# Patient Record
(demographics unavailable — no encounter records)

---

## 2025-01-22 NOTE — HISTORY OF PRESENT ILLNESS
[FreeTextEntry1] : Sana is a 17 year old female with MASLD who presents today with her mother for follow up. She was last seen in July 2024.   She has had no complaints and has been well since her last visit. She denies abdominal pain, nausea, vomiting, diarrhea, blood in stool, easy bleeding or bruising, rash, pruritus, jaundice, fevers, recurrent illnesses.   She has gained 6 pounds since her last visit 6 months ago. Her current weight is 242 pounds (99th percentile) and BMI is 41.8 (99th percentile). She reports still having trouble with portion control. We reviewed the types of food and portion sizes she should be eating. She also admits to not exercising often. She had a consult with nutrition at her last visit in July 2024 which she said was not helpful in making dietary adjustments.   Her last labs were done 7/9/24: AST/ALT 25/39 (17/36) (23/36) (32/57) (33/65) (45/82) (42/73) HgbA1c 5.6 (5.7) %  Abdominal ultrasounds:  9/14/20: Hepatomegaly with diffuse fatty infiltration of the liver, possible 2 mm gallbladder polyp vs adherent sludge   6/2/21: Fatty liver, 0.4 cm gallbladder polyp  8/1/22: No gallbladder polyp identified   A Fibroscan was performed today using the XL probe:  (297) (302) (305) (334) (329) (397) dB/m TE 6.0 (5.4) (6.3) (6.5) (6.1) (5.5) (6.1) kPa  Patient completed the NAFLD probiotic/placebo study in June 2022.   Sana is a senior in high school at [employer]. Choosing between [employer] and [employer] for next year.

## 2025-01-22 NOTE — CONSULT LETTER
[Dear  ___] : Dear  [unfilled], [Courtesy Letter:] : I had the pleasure of seeing your patient, [unfilled], in my office today. [Please see my note below.] : Please see my note below. [Consult Closing:] : Thank you very much for allowing me to participate in the care of this patient.  If you have any questions, please do not hesitate to contact me. [Sincerely,] : Sincerely, [FreeTextEntry3] : Mattie Rutledge-Kirti, \par  [facility]\par

## 2025-01-22 NOTE — CONSULT LETTER
[Dear  ___] : Dear  [unfilled], [Courtesy Letter:] : I had the pleasure of seeing your patient, [unfilled], in my office today. [Please see my note below.] : Please see my note below. [Consult Closing:] : Thank you very much for allowing me to participate in the care of this patient.  If you have any questions, please do not hesitate to contact me. [Sincerely,] : Sincerely, [FreeTextEntry3] : Mattie Rutledge-Kirti, \par  [facility]\par

## 2025-01-22 NOTE — HISTORY OF PRESENT ILLNESS
[FreeTextEntry1] : Sana is a 17 year old female with MASLD who presents today with her mother for follow up. She was last seen in July 2024.   She has had no complaints and has been well since her last visit. She denies abdominal pain, nausea, vomiting, diarrhea, blood in stool, easy bleeding or bruising, rash, pruritus, jaundice, fevers, recurrent illnesses.   She has gained 6 pounds since her last visit 6 months ago. Her current weight is 242 pounds (99th percentile) and BMI is 41.8 (99th percentile). She reports still having trouble with portion control. We reviewed the types of food and portion sizes she should be eating. She also admits to not exercising often. She had a consult with nutrition at her last visit in July 2024 which she said was not helpful in making dietary adjustments.   Her last labs were done 7/9/24: AST/ALT 25/39 (17/36) (23/36) (32/57) (33/65) (45/82) (42/73) HgbA1c 5.6 (5.7) %  Abdominal ultrasounds:  9/14/20: Hepatomegaly with diffuse fatty infiltration of the liver, possible 2 mm gallbladder polyp vs adherent sludge   6/2/21: Fatty liver, 0.4 cm gallbladder polyp  8/1/22: No gallbladder polyp identified   A Fibroscan was performed today using the XL probe:  (297) (302) (305) (334) (329) (397) dB/m TE 6.0 (5.4) (6.3) (6.5) (6.1) (5.5) (6.1) kPa  Patient completed the NAFLD probiotic/placebo study in June 2022.   Sana is a senior in high school at [employer]. Choosing between [employer] and [employer] for next year.

## 2025-01-22 NOTE — PHYSICAL EXAM
[Well Developed] : well developed [NAD] : in no acute distress [Moist & Pink Mucous Membranes] : moist and pink mucous membranes [CTAB] : lungs clear to auscultation bilaterally [Soft] : soft [Obese] : obese [Normal Bowel Sounds] : normal bowel sounds [No HSM] : no hepatosplenomegaly appreciated [Normal Tone] : normal tone [Well-Perfused] : well-perfused [Interactive] : interactive [Murmur] : no murmur [icteric] : anicteric [Respiratory Distress] : no respiratory distress  [Distended] : non distended [Tender] : non tender [Focal Deficits] : no focal deficits [Edema] : no edema [Cyanosis] : no cyanosis [Acanthosis Nigricans] : no acanthosis nigricans [Rash] : no rash [Jaundice] : no jaundice [de-identified] : + abdominal striae

## 2025-01-22 NOTE — PHYSICAL EXAM
[Well Developed] : well developed [NAD] : in no acute distress [Moist & Pink Mucous Membranes] : moist and pink mucous membranes [CTAB] : lungs clear to auscultation bilaterally [Soft] : soft [Obese] : obese [Normal Bowel Sounds] : normal bowel sounds [No HSM] : no hepatosplenomegaly appreciated [Normal Tone] : normal tone [Well-Perfused] : well-perfused [Interactive] : interactive [Murmur] : no murmur [icteric] : anicteric [Respiratory Distress] : no respiratory distress  [Distended] : non distended [Tender] : non tender [Focal Deficits] : no focal deficits [Edema] : no edema [Cyanosis] : no cyanosis [Acanthosis Nigricans] : no acanthosis nigricans [Rash] : no rash [Jaundice] : no jaundice [de-identified] : + abdominal striae

## 2025-01-22 NOTE — ASSESSMENT
[Educated Patient & Family about Diagnosis] : educated the patient and family about the diagnosis [FreeTextEntry1] : 17 year old female with MASLD and ongoing weight gain but with a stable steatosis score. Discussed extensively the importance of healthy eating, low carb/sugar diet, smaller portions, and exercise to help reduce weight. Sana was tearful during the visit when discussing her attempts at weight loss. We did discuss the benefit of a therapist, nutritionist and weight  (Dr Alas).   In regards to her gallbladder polyp, her most recent ultrasound did not visualize it, suggesting self-detachment vs sludge being mistaken for polyp in the past. No follow up needed.   1. Labs today  2. Healthy eating and exercise 3. Follow up in 6 months in the office for repeat Fibroscan and weight check before leaving for college 4. Consider weight , nutritionist, therapist given her consistent tearful encounters at each visit. Sana to consider and let us know.

## 2025-07-09 NOTE — CONSULT LETTER
[Dear  ___] : Dear  [unfilled], [Courtesy Letter:] : I had the pleasure of seeing your patient, [unfilled], in my office today. [Please see my note below.] : Please see my note below. [Consult Closing:] : Thank you very much for allowing me to participate in the care of this patient.  If you have any questions, please do not hesitate to contact me. [Sincerely,] : Sincerely, [FreeTextEntry3] : Mattie Rutledge-Kirti, \par  [facility]\par

## 2025-07-09 NOTE — CONSULT LETTER
[Dear  ___] : Dear  [unfilled], [Courtesy Letter:] : I had the pleasure of seeing your patient, [unfilled], in my office today. [Please see my note below.] : Please see my note below. [Consult Closing:] : Thank you very much for allowing me to participate in the care of this patient.  If you have any questions, please do not hesitate to contact me. [Sincerely,] : Sincerely, [FreeTextEntry3] : Mattie Rutledge-Kirti, \par  [facility]\par

## 2025-07-09 NOTE — ASSESSMENT
[Educated Patient & Family about Diagnosis] : educated the patient and family about the diagnosis [FreeTextEntry1] : 17 year old female with MASLD and ongoing weight gain but with a stable steatosis score. Discussed extensively the importance of healthy eating, low carb/sugar diet, smaller portions, and exercise to help reduce weight. Sana was tearful during the visit when discussing her attempts at weight loss. We did discuss the benefit of a therapist, nutritionist, and weight  (Dr Alas).   In regards to her gallbladder polyp, her most recent ultrasound did not visualize it, suggesting self-detachment vs sludge being mistaken for polyp in the past. No follow up needed.   1. Labs today  2. Healthy eating and exercise 3. Follow up in 6 months in the office for repeat Fibroscan and weight check before leaving for college 4. Consider weight , nutritionist, therapist given her consistent tearful encounters at each visit. Sana to consider and let us know.

## 2025-07-09 NOTE — PHYSICAL EXAM
[Well Developed] : well developed [NAD] : in no acute distress [Moist & Pink Mucous Membranes] : moist and pink mucous membranes [Soft] : soft [Obese] : obese [Normal Bowel Sounds] : normal bowel sounds [No HSM] : no hepatosplenomegaly appreciated [Normal Tone] : normal tone [Well-Perfused] : well-perfused [Interactive] : interactive [icteric] : anicteric [Respiratory Distress] : no respiratory distress  [Distended] : non distended [Tender] : non tender [Focal Deficits] : no focal deficits [Edema] : no edema [Cyanosis] : no cyanosis [Acanthosis Nigricans] : no acanthosis nigricans [Rash] : no rash [Jaundice] : no jaundice [de-identified] : + abdominal striae

## 2025-07-09 NOTE — PHYSICAL EXAM
[Well Developed] : well developed [NAD] : in no acute distress [Moist & Pink Mucous Membranes] : moist and pink mucous membranes [Soft] : soft [Obese] : obese [Normal Bowel Sounds] : normal bowel sounds [No HSM] : no hepatosplenomegaly appreciated [Normal Tone] : normal tone [Well-Perfused] : well-perfused [Interactive] : interactive [icteric] : anicteric [Respiratory Distress] : no respiratory distress  [Distended] : non distended [Tender] : non tender [Focal Deficits] : no focal deficits [Edema] : no edema [Cyanosis] : no cyanosis [Acanthosis Nigricans] : no acanthosis nigricans [Rash] : no rash [Jaundice] : no jaundice [de-identified] : + abdominal striae

## 2025-07-09 NOTE — HISTORY OF PRESENT ILLNESS
[FreeTextEntry1] : Sana is a 17 year old female with MASLD who presents today with her mother for follow up. She was last seen in January 2025.   She has had no complaints and has been well since her last visit. She denies abdominal pain, nausea, vomiting, diarrhea, blood in stool, easy bleeding or bruising, rash, pruritus, jaundice, fevers, recurrent illnesses.   She has gained 3 pounds since her last visit 6 months ago. Her current weight is 245 pounds (99th percentile) and BMI is 42.4 (99th percentile). She reports still having trouble with portion control. We reviewed the types of food and portion sizes she should be eating. She also admits to not exercising often. She had a consult with nutrition in July 2024 which she said was not helpful in making dietary adjustments.   Her last labs were done 7/9/24: AST/ALT 25/39 (17/36) (23/36) (32/57) (33/65) (45/82) (42/73) HgbA1c 5.6 (5.7) %  Abdominal ultrasounds:  9/14/20: Hepatomegaly with diffuse fatty infiltration of the liver, possible 2 mm gallbladder polyp vs adherent sludge   6/2/21: Fatty liver, 0.4 cm gallbladder polyp  8/1/22: No gallbladder polyp identified   A Fibroscan was performed today using the M probe (had been the XL probe in the past):  (309) (297) (302) (305) (334) (329) (397) dB/m TE 6.5 (6.0) (5.4) (6.3) (6.5) (6.1) (5.5) (6.1) kPa  Patient completed the NAFLD probiotic/placebo study in June 2022.   Sana is attending [employer] in the fall.

## 2025-07-09 NOTE — HISTORY OF PRESENT ILLNESS
[FreeTextEntry1] : Sana is a 17 year old female with MASLD who presents today with her mother for follow up. She was last seen in January 2025.   She has had no complaints and has been well since her last visit. She denies abdominal pain, nausea, vomiting, diarrhea, blood in stool, easy bleeding or bruising, rash, pruritus, jaundice, fevers, recurrent illnesses.   She has gained 3 pounds since her last visit 6 months ago. Her current weight is 245 pounds (99th percentile) and BMI is 42.4 (99th percentile). She reports still having trouble with portion control. We reviewed the types of food and portion sizes she should be eating. She also admits to not exercising often. She had a consult with nutrition in July 2024 which she said was not helpful in making dietary adjustments.   Her last labs were done 7/9/24: AST/ALT 25/39 (17/36) (23/36) (32/57) (33/65) (45/82) (42/73) HgbA1c 5.6 (5.7) %  Abdominal ultrasounds:  9/14/20: Hepatomegaly with diffuse fatty infiltration of the liver, possible 2 mm gallbladder polyp vs adherent sludge   6/2/21: Fatty liver, 0.4 cm gallbladder polyp  8/1/22: No gallbladder polyp identified   A Fibroscan was performed today using the M probe (had been the XL probe in the past):  (309) (297) (302) (305) (334) (329) (397) dB/m TE 6.5 (6.0) (5.4) (6.3) (6.5) (6.1) (5.5) (6.1) kPa  Patient completed the NAFLD probiotic/placebo study in June 2022.   Sana is attending [employer] in the fall.